# Patient Record
Sex: MALE | Race: WHITE
[De-identification: names, ages, dates, MRNs, and addresses within clinical notes are randomized per-mention and may not be internally consistent; named-entity substitution may affect disease eponyms.]

---

## 2021-04-03 ENCOUNTER — HOSPITAL ENCOUNTER (OUTPATIENT)
Dept: HOSPITAL 33 - SDC | Age: 66
Discharge: HOME | End: 2021-04-03
Attending: SURGERY
Payer: COMMERCIAL

## 2021-04-03 VITALS — SYSTOLIC BLOOD PRESSURE: 168 MMHG | DIASTOLIC BLOOD PRESSURE: 100 MMHG | HEART RATE: 60 BPM | OXYGEN SATURATION: 100 %

## 2021-04-03 DIAGNOSIS — K64.2: ICD-10-CM

## 2021-04-03 DIAGNOSIS — K57.30: ICD-10-CM

## 2021-04-03 DIAGNOSIS — K64.1: ICD-10-CM

## 2021-04-03 DIAGNOSIS — K63.5: ICD-10-CM

## 2021-04-03 DIAGNOSIS — Z86.010: ICD-10-CM

## 2021-04-03 DIAGNOSIS — Z87.19: ICD-10-CM

## 2021-04-03 DIAGNOSIS — Z09: Primary | ICD-10-CM

## 2021-04-05 NOTE — HP
DATE OF SURGERY:  04/03/2021  



HISTORY OF PRESENT ILLNESS:  The patient is a 65 year-old with some history of rectal 
bleeding, history of some diverticulosis in the past. History of COVID infection several 
months ago and has been vaccinated since then. He has prior history of polyps in the past. 
He is in need of follow up screening colonoscopy. 



PAST MEDICAL HISTORY:  He had hernia repair in the past. Benign prostatic hypertrophy.



PAST SURGICAL HISTORY:  Tonsillectomy. Colonoscopy.



MEDICATIONS:  Metoprolol, dutasteride as well as he had some Benicar.



ALLERGIES:  NKDA.

  

FAMILY HISTORY:  Negative in regards to this problem.  



SOCIAL HISTORY:  No smoking or alcohol abuse. 



REVIEW OF SYSTEMS:  Fourteen systems reviewed per admission assessment. 



PHYSICAL EXAMINATION:  

GENERAL:  No acute distress.

HEENT: Sclerae nonicteric.

NECK:  No JVD.

CHEST: Equal excursion, nonlabored breathing. 

CVS:  Regular rate and rhythm. 

ABDOMEN:  Soft.  

EXTREMITIES: No cyanosis. 

NEURO:  Alert, moving extremities symmetrically.   

PSYCH: Appropriate mood and affect.  



IMPRESSION:  History of polyps, history of some rectal bleeding. He is in need of follow 
up colonoscopy. Discussed the options of possible internal hemorrhoid banding. General 
risk of bleeding or infection, risk of bowel injury or perforation, risk of missed, 
nondiagnosis or incomplete exam, risk of progression of hemorrhoid disease, preoperative 
risk of bleeding, general risk of anesthesia or sedation but not limited to, consent 
obtained, will proceed with colonoscopy, possible internal hemorrhoid banding if indicated 
as an outpatient.

## 2021-04-05 NOTE — OP
SURGERY DATE/TIME:   04/03/2021 0841



PREOPERATIVE DIAGNOSIS:  History of rectal bleeding, history of polyps, history of 
diverticulosis. 



POSTOPERATIVE DIAGNOSES:

1) Small early polyp versus hyperplastic lesion sigmoid colon. 

2) Moderate to severe diverticulosis.

3) Grade II to III internal and external hemorrhoids (likely source of recent rectal 
bleeding). 

4) ASA Class 2. 

5) Withdrawal time around 12 minutes. 



PROCEDURES:  

1) Colonoscopy to cecum.

2) Hot biopsy small early polyp versus hyperplastic lesion sigmoid colon. 

3) Internal hemorrhoid banding x3 columns.



SURGEON:       Dr. Fawad Laguna.



ANESTHESIA:    MAC.



ESTIMATED BLOOD LOSS:    Minimal.  



INDICATIONS:  As noted above. Risks and benefits explained in detail but not limited to 
and consent was obtained. Prep overall was fair. 



DESCRIPTION OF PROCEDURE AND FINDINGS:  The patient is taken to the endoscopy room. MAC 
anesthesia introduced. After official time out and no disagreement with planned procedure, 
digital rectal exam did not reveal any new rectal masses. Prostate seems to be unchanged 
from previous. Video colonoscope carefully inserted up the tortuous sigmoid, descending, 
transverse colon around to the proximal ascending colon requiring position changes and two 
staff members putting pressure on the abdomen to be able to see over the edge of the 
ileocecal valve. Ileocecal valve and appendiceal orifice area were photo documented. The 
scope slowly and carefully withdrawn over the next 12 minutes stopping to suction. Prep 
overall was fair with some liquidy semisolid stool suction irrigated as well as possible 
with a few solid stool chunks and some diverticula. The scope is slowly and carefully 
withdrawn. There were no signs of any large polyps, masses or obstructing lesions. He did 
have moderate to severe diverticulosis in the left colon. He did have some small 2 mm 
early polyp versus hyperplastic lesion removed with hot biopsy forceps and brief bursts of 
cautery in the sigmoid colon. Scope carefully pulled back. He did have some grade II to 
III internal and external hemorrhoids. He had the rectal bleeding. It was felt that this 
warranted as he preferred to have a trial internal hemorrhoid banding. As the scope was 
withdrawn and remained under MAC anesthesia, half-moon lubricated retractor carefully 
inserted. Suction  carefully applied first to left lateral position and the right 
posterior position and to the right anterior sigmoidal column position and a good tuft of 
tissue grasping the top edge of the internal hemorrhoid hopefully acquiring decent amount 
of material and a good tuft of tissue in all three locations. Otherwise the patient 
tolerated the procedure well. There were no immediate complications. Findings discussed 
with his wife out in the waiting area.